# Patient Record
Sex: MALE | Employment: STUDENT | URBAN - METROPOLITAN AREA
[De-identification: names, ages, dates, MRNs, and addresses within clinical notes are randomized per-mention and may not be internally consistent; named-entity substitution may affect disease eponyms.]

---

## 2018-06-06 ENCOUNTER — NURSE TRIAGE (OUTPATIENT)
Dept: NURSING | Facility: CLINIC | Age: 28
End: 2018-06-06

## 2018-06-07 NOTE — TELEPHONE ENCOUNTER
Erinn, patient's friend called with patient present. Patient gave FNA verbal permission to speak with Erinn regarding his medical concerns. About 1-2 days ago, patient scraped his right forearm at work. Today he noticed a hard lump, possibly swollen antecubital  lymph node, on right arm that is painful and slightly larger than a marble. Patient reports the scrape is red, swollen and had some yellowish clear drainage earlier. Patient also reports having body aches and fatigue. Patient did have a fever earlier but his temp is currently 98.4 (oral).  Denies difficulty breathing, swelling of face/scrotum/groin, swollen lymph node in neck, and fever. Reviewed guidelines below and advised for patient to be seen within 24 hours by PCP. Patient goes to [facility] so he will call the clinic in the morning to schedule an appointment. RN advised to call back with any changes, worsening of symptoms, and questions or concerns.     Reason for Disposition    [1] Single large node AND [2] size > 1 inch (2.5 cm) AND [3] no fever    Additional Information    Negative: Severe difficulty breathing (e.g., struggling for each breath, speaks in single words)    Negative: Known hernia is main concern (i.e., soft lump or swelling in the groin that goes away when you push on it)    Negative: Swelling of scrotum is main symptom    Negative: Swelling of face is main symptom    Negative: [1] Swollen node is in the neck AND [2] < 1 inch (2.5 cm) in size AND [3] sore throat is main symptom    Negative: [1] Node is in the neck AND [2] causes difficulty breathing    Negative: [1] Node is in the neck AND [2] can't swallow fluids    Negative: Fever > 103 F (39.4 C)    Negative: [1] Lump or swelling in groin AND [2] pulsating (like heartbeat)    Negative: Patient sounds very sick or weak to the triager    Negative: [1] Single large node AND [2] size > 1 inch (2.5 cm) AND [3] fever    Negative: [1] Overlying skin is red AND [2] fever    Protocols used:  LYMPH NODES SWOLLEN-ADULT-    Raquel Manley, [facility]

## 2019-03-20 ENCOUNTER — APPOINTMENT (OUTPATIENT)
Dept: CT IMAGING | Facility: CLINIC | Age: 29
End: 2019-03-20
Attending: FAMILY MEDICINE
Payer: COMMERCIAL

## 2019-03-20 ENCOUNTER — HOSPITAL ENCOUNTER (EMERGENCY)
Facility: CLINIC | Age: 29
Discharge: HOME | End: 2019-03-20
Attending: FAMILY MEDICINE | Admitting: FAMILY MEDICINE
Payer: COMMERCIAL

## 2019-03-20 VITALS
TEMPERATURE: 98.3 F | OXYGEN SATURATION: 100 % | SYSTOLIC BLOOD PRESSURE: 130 MMHG | HEART RATE: 111 BPM | DIASTOLIC BLOOD PRESSURE: 78 MMHG | RESPIRATION RATE: 16 BRPM

## 2019-03-20 DIAGNOSIS — R10.84 ABDOMINAL PAIN, GENERALIZED: ICD-10-CM

## 2019-03-20 LAB
ALBUMIN SERPL-MCNC: 4.3 G/DL (ref 3.4–5)
ALBUMIN UR-MCNC: NEGATIVE MG/DL
ALP SERPL-CCNC: 56 U/L (ref 40–150)
ALT SERPL W P-5'-P-CCNC: 20 U/L (ref 0–70)
ANION GAP SERPL CALCULATED.3IONS-SCNC: 6 MMOL/L (ref 3–14)
APPEARANCE UR: ABNORMAL
AST SERPL W P-5'-P-CCNC: 11 U/L (ref 0–45)
BACTERIA #/AREA URNS HPF: ABNORMAL /HPF
BASOPHILS # BLD AUTO: 0 10E9/L (ref 0–0.2)
BASOPHILS NFR BLD AUTO: 0.8 %
BILIRUB SERPL-MCNC: 0.5 MG/DL (ref 0.2–1.3)
BILIRUB UR QL STRIP: NEGATIVE
BUN SERPL-MCNC: 18 MG/DL (ref 7–30)
CALCIUM SERPL-MCNC: 8.9 MG/DL (ref 8.5–10.1)
CHLORIDE SERPL-SCNC: 111 MMOL/L (ref 94–109)
CO2 SERPL-SCNC: 27 MMOL/L (ref 20–32)
COLOR UR AUTO: YELLOW
CREAT SERPL-MCNC: 0.88 MG/DL (ref 0.66–1.25)
DIFFERENTIAL METHOD BLD: NORMAL
EOSINOPHIL # BLD AUTO: 0.1 10E9/L (ref 0–0.7)
EOSINOPHIL NFR BLD AUTO: 1.6 %
ERYTHROCYTE [DISTWIDTH] IN BLOOD BY AUTOMATED COUNT: 12.1 % (ref 10–15)
GFR SERPL CREATININE-BSD FRML MDRD: >90 ML/MIN/{1.73_M2}
GLUCOSE SERPL-MCNC: 86 MG/DL (ref 70–99)
GLUCOSE UR STRIP-MCNC: NEGATIVE MG/DL
HCT VFR BLD AUTO: 42.1 % (ref 40–53)
HGB BLD-MCNC: 14.6 G/DL (ref 13.3–17.7)
HGB UR QL STRIP: NEGATIVE
IMM GRANULOCYTES # BLD: 0 10E9/L (ref 0–0.4)
IMM GRANULOCYTES NFR BLD: 0.4 %
KETONES UR STRIP-MCNC: NEGATIVE MG/DL
LEUKOCYTE ESTERASE UR QL STRIP: NEGATIVE
LIPASE SERPL-CCNC: 125 U/L (ref 73–393)
LYMPHOCYTES # BLD AUTO: 1.2 10E9/L (ref 0.8–5.3)
LYMPHOCYTES NFR BLD AUTO: 24.7 %
MCH RBC QN AUTO: 28.6 PG (ref 26.5–33)
MCHC RBC AUTO-ENTMCNC: 34.7 G/DL (ref 31.5–36.5)
MCV RBC AUTO: 82 FL (ref 78–100)
MONOCYTES # BLD AUTO: 0.5 10E9/L (ref 0–1.3)
MONOCYTES NFR BLD AUTO: 9.7 %
NEUTROPHILS # BLD AUTO: 3.2 10E9/L (ref 1.6–8.3)
NEUTROPHILS NFR BLD AUTO: 62.8 %
NITRATE UR QL: NEGATIVE
NRBC # BLD AUTO: 0 10*3/UL
NRBC BLD AUTO-RTO: 0 /100
PH UR STRIP: 7 PH (ref 5–7)
PLATELET # BLD AUTO: 224 10E9/L (ref 150–450)
POTASSIUM SERPL-SCNC: 4 MMOL/L (ref 3.4–5.3)
PROT SERPL-MCNC: 7.6 G/DL (ref 6.8–8.8)
RBC # BLD AUTO: 5.11 10E12/L (ref 4.4–5.9)
RBC #/AREA URNS AUTO: 2 /HPF (ref 0–2)
SODIUM SERPL-SCNC: 144 MMOL/L (ref 133–144)
SOURCE: ABNORMAL
SP GR UR STRIP: 1.02 (ref 1–1.03)
UROBILINOGEN UR STRIP-MCNC: 0 MG/DL (ref 0–2)
WBC # BLD AUTO: 5 10E9/L (ref 4–11)
WBC #/AREA URNS AUTO: 1 /HPF (ref 0–5)

## 2019-03-20 PROCEDURE — 80053 COMPREHEN METABOLIC PANEL: CPT | Performed by: FAMILY MEDICINE

## 2019-03-20 PROCEDURE — 74176 CT ABD & PELVIS W/O CONTRAST: CPT

## 2019-03-20 PROCEDURE — 87086 URINE CULTURE/COLONY COUNT: CPT | Performed by: FAMILY MEDICINE

## 2019-03-20 PROCEDURE — 83690 ASSAY OF LIPASE: CPT | Performed by: FAMILY MEDICINE

## 2019-03-20 PROCEDURE — 96361 HYDRATE IV INFUSION ADD-ON: CPT

## 2019-03-20 PROCEDURE — 25000128 H RX IP 250 OP 636: Performed by: FAMILY MEDICINE

## 2019-03-20 PROCEDURE — 99284 EMERGENCY DEPT VISIT MOD MDM: CPT | Mod: 25

## 2019-03-20 PROCEDURE — 25800030 ZZH RX IP 258 OP 636: Performed by: FAMILY MEDICINE

## 2019-03-20 PROCEDURE — 81001 URINALYSIS AUTO W/SCOPE: CPT | Performed by: FAMILY MEDICINE

## 2019-03-20 PROCEDURE — 99284 EMERGENCY DEPT VISIT MOD MDM: CPT | Mod: Z6 | Performed by: FAMILY MEDICINE

## 2019-03-20 PROCEDURE — 85025 COMPLETE CBC W/AUTO DIFF WBC: CPT | Performed by: FAMILY MEDICINE

## 2019-03-20 PROCEDURE — 96374 THER/PROPH/DIAG INJ IV PUSH: CPT

## 2019-03-20 RX ORDER — KETOROLAC TROMETHAMINE 30 MG/ML
30 INJECTION, SOLUTION INTRAMUSCULAR; INTRAVENOUS ONCE
Status: COMPLETED | OUTPATIENT
Start: 2019-03-20 | End: 2019-03-20

## 2019-03-20 RX ORDER — SODIUM CHLORIDE 9 MG/ML
INJECTION, SOLUTION INTRAVENOUS CONTINUOUS
Status: DISCONTINUED | OUTPATIENT
Start: 2019-03-20 | End: 2019-03-20 | Stop reason: HOSPADM

## 2019-03-20 RX ADMIN — SODIUM CHLORIDE: 9 INJECTION, SOLUTION INTRAVENOUS at 15:24

## 2019-03-20 RX ADMIN — KETOROLAC TROMETHAMINE 30 MG: 30 INJECTION, SOLUTION INTRAMUSCULAR at 15:22

## 2019-03-20 NOTE — DISCHARGE INSTRUCTIONS
Return to the Emergency Room if the following occurs:     Severely worsened pain, repeated vomiting/dehydration, fever >101, or for any concern at anytime.    Or, follow-up with the following provider as we discussed:     Consider follow up with your surgeon to discuss your concerns about abdominal wall hernia.    Medications discussed:    Ibuprofen / tylenol alternating every three hours for comfort, as needed.    If you received pain-relieving or sedating medication during your time in the ER, avoid alcohol, driving automobiles, or working with machinery.  Also, a responsible adult must stay with you.        Call the Nurse Advice Line at [phone] or [phone] for any concern at anytime.

## 2019-03-20 NOTE — ED PROVIDER NOTES
HPI  Current medications, past medical history, and social history are reviewed.    Is a 28-year-old male presenting with complaints of right-sided flank pain as well as abdominal pain.  He is most concerned that he has a hernia.  Distant history of total colectomy for ulcerative colitis.  No personal or family history of ureteral stones.  His history of necrotizing fasciitis was involving the right upper thigh.  The patient first noted his right sided mid abdominal and flank pain starting 6 months ago.  It came on while he was at work, lifting.  He had discomfort with repeated lifting, pushing, pulling over the ensuing 3 weeks.  The pain then went away on its own.  He recognized recurrence of pain starting about 5 weeks ago.  The pain has been constant but dull since that time, still worsened by activities such as the above.  Then, starting a week ago his pain worsened.  He points towards his mid abdomen, both left and right.  He points toward his right flank.  He denies midline back pain but says that the pain is more over his kidney.  He denies dysuria, urgency, frequency, hematuria.  He does report some changes in bowels, with his stool being slower to pass than usual.  He denies nausea or vomiting.  He denies groin pain,    ROS: All other review of systems are negative other than that noted above.     Past Medical History:   Diagnosis Date     ADD (attention deficit disorder)      Chronic diarrhea      Chronic insomnia      Necrotising fasciitis     Requiring multiple debridements      Neuropathy      Ulcerative colitis (H)      Past Surgical History:   Procedure Laterality Date     IRRIGATION AND DEBRIDEMENT SOFT TISSUE LOWER EXTREMITY, COMBINED Right 2008    Multiple debridement 2nd nec fasc     PROCTOCOLECTOMY, ILEOSTOMY, COMBINED       TAKEDOWN ILEOSTOMY       Medicines    Dextroamphetamine Sulfate (DEXEDRINE PO)   DULoxetine (CYMBALTA) 30 MG capsule   gabapentin (NEURONTIN) 300 MG capsule    guaiFENesin-codeine (ROBITUSSIN AC) 100-10 MG/5ML SOLN   IBUPROFEN PO   mirtazapine (REMERON) 15 MG tablet     No family history on file.  Social History     Tobacco Use     Smoking status: Former Smoker     Smokeless tobacco: Never Used   Substance Use Topics     Alcohol use: Yes     Comment: occasionally     Drug use: No         PHYSICAL  /78   Pulse 111   Temp 98.3  F (36.8  C)   Resp 16   SpO2 100%   General: Patient is alert and in mild distress.  Conversational, pleasant.  Neurological: Alert.  Moving upper and lower extremities equally, bilaterally.  Head / Neck: Atraumatic.  Ears: Not done.  Eyes: Pupils are equal, round, and reactive.  Normal conjunctiva.  Nose: Midline.  No epistaxis.  Mouth / Throat: No ulcerations or lesions.  Upper pharynx is not erythematous.  Moist.  Respiratory: No respiratory distress. CTA B.  Cardiovascular: Regular rhythm.  Peripheral extremities are warm.  No edema.  No calf tenderness.  Abdomen / Pelvis: Moderately tender in the right abdomen, mid abdomen, and left abdomen.  No distention.  Soft throughout.  Genitalia: Not done.  Musculoskeletal: No tenderness over major muscles and joints.  Skin: No evidence of rash or trauma.        ED COURSE  1430.  Lab values were reviewed with the patient.  Urine analysis still pending.  Not requesting medication for analgesia.    Labs Ordered and Resulted from Time of ED Arrival Up to the Time of Departure from the ED   ROUTINE UA WITH MICROSCOPIC - Abnormal; Notable for the following components:       Result Value    Bacteria Urine Few (*)     All other components within normal limits   COMPREHENSIVE METABOLIC PANEL - Abnormal; Notable for the following components:    Chloride 111 (*)     All other components within normal limits   CBC WITH PLATELETS DIFFERENTIAL   LIPASE   URINE CULTURE AEROBIC BACTERIAL     1508.  I reviewed the results with the patient.  He is electing to have a CT scan of his abdomen and pelvis.  I have  placed an order for scan without contrast to look specifically for ureteral obstruction.    1607.  The patient has an unremarkable CT scan.  This is reviewed with him along with the available blood and urine data.  Low concern for severe intra-abdominal pathology requiring hospitalization or surgery consultation.  Abdominal wall hernia?  Internal adhesions causing pain?  Follow-up with his general surgeon to discuss further.  Return here for worsening as discussed.    Medications   sodium chloride 0.9% infusion ( Intravenous New Bag 3/20/19 1524)   ketorolac (TORADOL) injection 30 mg (30 mg Intravenous Given 3/20/19 1522)         IMPRESSION    ICD-10-CM    1. Abdominal pain, generalized R10.84          Critical Care time:  none                    Kaushal Duggan MD  03/20/19 7532

## 2019-03-20 NOTE — ED AVS SNAPSHOT
[facility] Emergency Department  [address] [address]  Phone:  [phone]  Fax:  [phone]                                    Carrington Quiles   MRN: [id number]    Department:  [facility] Emergency Department   Date of Visit:  3/20/2019           After Visit Summary Signature Page    I have received my discharge instructions, and my questions have been answered. I have discussed any challenges I see with this plan with the nurse or doctor.    ..........................................................................................................................................  Patient/Patient Representative Signature      ..........................................................................................................................................  Patient Representative Print Name and Relationship to Patient    ..................................................               ................................................  Date                                   Time    ..........................................................................................................................................  Reviewed by Signature/Title    ...................................................              ..............................................  Date                                               Time          22EPIC Rev 08/18

## 2019-03-21 ENCOUNTER — TELEPHONE (OUTPATIENT)
Dept: SURGERY | Facility: CLINIC | Age: 29
End: 2019-03-21

## 2019-03-21 ENCOUNTER — OFFICE VISIT (OUTPATIENT)
Dept: SURGERY | Facility: CLINIC | Age: 29
End: 2019-03-21
Payer: COMMERCIAL

## 2019-03-21 VITALS
TEMPERATURE: 98.8 F | BODY MASS INDEX: 21.86 KG/M2 | SYSTOLIC BLOOD PRESSURE: 142 MMHG | HEART RATE: 85 BPM | HEIGHT: 68 IN | WEIGHT: 144.2 LBS | DIASTOLIC BLOOD PRESSURE: 83 MMHG | OXYGEN SATURATION: 100 %

## 2019-03-21 DIAGNOSIS — R10.13 ABDOMINAL PAIN, EPIGASTRIC: Primary | ICD-10-CM

## 2019-03-21 LAB
BACTERIA SPEC CULT: NO GROWTH
Lab: NORMAL
SPECIMEN SOURCE: NORMAL

## 2019-03-21 RX ORDER — ACETAMINOPHEN 325 MG/1
325-650 TABLET ORAL EVERY 6 HOURS PRN
COMMUNITY

## 2019-03-21 ASSESSMENT — ENCOUNTER SYMPTOMS
HALLUCINATIONS: 0
FEVER: 0
NIGHT SWEATS: 0
DECREASED APPETITE: 1
ALTERED TEMPERATURE REGULATION: 0
WEIGHT LOSS: 1
CONSTIPATION: 1
VOMITING: 0
POLYDIPSIA: 0
INCREASED ENERGY: 0
BOWEL INCONTINENCE: 0
FATIGUE: 0
RECTAL PAIN: 0
BLOATING: 1
NAUSEA: 1
BLOOD IN STOOL: 0
HEARTBURN: 0
WEIGHT GAIN: 0
DIARRHEA: 1
ABDOMINAL PAIN: 1
POLYPHAGIA: 0
JAUNDICE: 0
CHILLS: 0

## 2019-03-21 ASSESSMENT — PAIN SCALES - GENERAL: PAINLEVEL: SEVERE PAIN (7)

## 2019-03-21 ASSESSMENT — MIFFLIN-ST. JEOR: SCORE: 1598.59

## 2019-03-21 NOTE — LETTER
Date:March 25, 2019      Patient was self referred, no letter generated. Do not send.        [facility] Health Information

## 2019-03-21 NOTE — PROGRESS NOTES
Service Date: 03/21/2019      Mr. Quiles is here for complaint of chronic abdominal pain lasting about the last 5 months.  This pain first started taking place after heavy exercise at work.  This pain was band-like in nature across the middle of the abdomen with radiation to the back.  It seems to get worse with heavy lifting at work.  It has not been associated with nausea, vomiting, diarrhea, and constipation.  It has not been associated with fevers or chills.  He has not had a mass in this area.  This pain is usually manageable.  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  At that point, he started having a similar pain again which was much worse.  He was seen in the emergency room yesterday at [facility] where he underwent an exam, CT scan and laboratory draws.  CT scan was unremarkable.  Labs including CBC and a comprehensive metabolic panel was unremarkable and his exam was unremarkable.      PAST MEDICAL HISTORY:  Significant for a total colectomy with ileal pouch for ulcerative colitis.  His postoperative course was complicated by necrotizing fasciitis requiring debridement and skin grafting.  I was his surgeon for the debridement and skin grafting.  He has otherwise been doing well.  He lives alone, is a nonsmoker, nondrinker.       MEDICATIONS:  Include gabapentin and mirtazapine.      REVIEW OF SYSTEMS:  Notable for 2-3 soft formed stools per day.  He denies any blood in his stools.      PHYSICAL EXAMINATION:   VITAL SIGNS:  Stable.  He is afebrile.   GENERAL:  This is a well-developed, well-nourished white male appearing his stated age.   HEENT:  Normocephalic, atraumatic.   NECK:  Supple.   CHEST:  Clear.   ABDOMEN:  His abdomen is notable for a poorly healed midline scar that is somewhat firm in nature but does not reproduce the pain on superficial palpation.  He does get some pain on deep palpation.  He has no masses.  His testicles are  descended bilaterally.  He has no inguinal hernias.  His right groin and right thigh show a poorly healed scar without any erythema, induration, or abnormal masses.      ASSESSMENT:  Epigastric abdominal pain, likely related to scar.  I worked through with Mr. Quiles the approach of ibuprofen in the morning and the evening, hot alternating with cold packs in the morning and evening, decrease in the amount of heavy lifting that he is doing at work with consideration for contracture release by physical therapy if he does not gain some improvement with this.  I am happy to see him again if his pain continues.  He can call my office ([facility]) and ask about setting up physical therapy if he wishes to try this out.         BASIL BROOKE MD     Visit time 25 min, 15 min counselling        D: 2019   T: 2019   MT: [id number]      Name:     TED QUILES   MRN:      [id number]        Account:      [id number]   :      1990           Service Date: 2019      Document: [id number]

## 2019-03-21 NOTE — TELEPHONE ENCOUNTER
This writer received a call from the patient's mother asking to have a consult with Dr. Pramod Asencio. This writer had been contacted by Dr. Asencio prior to this call and he let this writer know that the patient could be seen today, 03/21/2019, at 3:00 pm. The patient's mother confirmed appointment time and location.

## 2019-03-21 NOTE — LETTER
3/21/2019       RE: Carrington Quiles  [address]  [place] [address]     Dear Colleague,    Thank you for referring your patient, Carrington Quiles, to the [facility] GENERAL SURGERY at [facility]. Please see a copy of my visit note below.    Service Date: 03/21/2019      Mr. Quiles is here for complaint of chronic abdominal pain lasting about the last 5 months.  This pain first started taking place after heavy exercise at work.  This pain was band-like in nature across the middle of the abdomen with radiation to the back.  It seems to get worse with heavy lifting at work.  It has not been associated with nausea, vomiting, diarrhea, and constipation.  It has not been associated with fevers or chills.  He has not had a mass in this area.  This pain is usually manageable.  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  At that point, he started having a similar pain again which was much worse.  He was seen in the emergency room yesterday at [facility] where he underwent an exam, CT scan and laboratory draws.  CT scan was unremarkable.  Labs including CBC and a comprehensive metabolic panel was unremarkable and his exam was unremarkable.      PAST MEDICAL HISTORY:  Significant for a total colectomy with ileal pouch for ulcerative colitis.  His postoperative course was complicated by necrotizing fasciitis requiring debridement and skin grafting.  I was his surgeon for the debridement and skin grafting.  He has otherwise been doing well.  He lives alone, is a nonsmoker, nondrinker.       MEDICATIONS:  Include gabapentin and mirtazapine.      REVIEW OF SYSTEMS:  Notable for 2-3 soft formed stools per day.  He denies any blood in his stools.      PHYSICAL EXAMINATION:   VITAL SIGNS:  Stable.  He is afebrile.   GENERAL:  This is a well-developed, well-nourished white male appearing his stated age.   HEENT:   Normocephalic, atraumatic.   NECK:  Supple.   CHEST:  Clear.   ABDOMEN:  His abdomen is notable for a poorly healed midline scar that is somewhat firm in nature but does not reproduce the pain on superficial palpation.  He does get some pain on deep palpation.  He has no masses.  His testicles are descended bilaterally.  He has no inguinal hernias.  His right groin and right thigh show a poorly healed scar without any erythema, induration, or abnormal masses.      ASSESSMENT:  Epigastric abdominal pain, likely related to scar.  I worked through with Mr. Quiles the approach of ibuprofen in the morning and the evening, hot alternating with cold packs in the morning and evening, decrease in the amount of heavy lifting that he is doing at work with consideration for contracture release by physical therapy if he does not gain some improvement with this.  I am happy to see him again if his pain continues.  He can call my office ([facility]) and ask about setting up physical therapy if he wishes to try this out.         FABRICE BROOKE MD     Visit time 25 min, 15 min counselling        D: 2019   T: 2019   MT: KK      Name:     ETD QUILES   MRN:      [id number]        Account:      [id number]   :      1990           Service Date: 2019      Document: [id number]        Again, thank you for allowing me to participate in the care of your patient.      Sincerely,    Fabrice Brooke MD

## 2019-03-21 NOTE — NURSING NOTE
"  Chief Complaint   Patient presents with     Consult     New consult, follow up for abdominal pain     Vitals:    03/21/19 1536   BP: 142/83   Pulse: 85   Temp: 98.8  F (37.1  C)   TempSrc: Oral   SpO2: 100%   Weight: 65.4 kg (144 lb 3.2 oz)   Height: 1.727 m (5' 8\")     Body mass index is 21.93 kg/m .  Nemesio Rhodes CMA    "

## 2019-03-22 NOTE — RESULT ENCOUNTER NOTE
Final urine culture report is NEGATIVE per [facility] Lab Result protocol.    If NEGATIVE result, no change in treatment, per [facility] Lab Result protocol.

## 2019-04-08 ENCOUNTER — PATIENT OUTREACH (OUTPATIENT)
Dept: SURGERY | Facility: CLINIC | Age: 29
End: 2019-04-08

## 2019-04-08 DIAGNOSIS — R10.13 ABDOMINAL PAIN, EPIGASTRIC: Primary | ICD-10-CM

## 2019-04-08 NOTE — PROGRESS NOTES
Carrington Quiles is a patient of Dr. Asencio's that he has been following for abdominal pain/scarring.  The patient left a VM asking for a return call.  Attempted to reach patient with no answer.  LM to call office- contact information provided.

## 2019-04-09 NOTE — PROGRESS NOTES
Attempted to reach patient x2 regarding recent VM message left on the triage line.  No answer- LM on VM to call office.    Chart reviewed with Dr. Asencio.  He would like an update if patient has gone to physical therapy for contracture release.  Additionally, he would like to ensure that the patient is established with a PCP.

## 2019-04-09 NOTE — PROGRESS NOTES
"Spoke with patient. He states that he has been experiencing \"right kidney pain\" and was evaluated in the ED.  He denies dysuria, frequency, pain, etc.  He states that he had an evaluation in the ED which included at CT and labs.  Michael reports that the pain is in a different location than when he met with Dr. Asencio.  He has not pursued PT for scar contracture release- however, he has established care with a PCP, Dr. Vladimir Amin in [place].    P: Place order for PT and patient will trial to see if there is an improvement.  If no improvement he will return to see Dr. Asencio. In the interim, if he has worsening symptoms he was asked to be evaluated in the ED.  All of his questions were answered.  "